# Patient Record
Sex: FEMALE | Race: WHITE | NOT HISPANIC OR LATINO | URBAN - METROPOLITAN AREA
[De-identification: names, ages, dates, MRNs, and addresses within clinical notes are randomized per-mention and may not be internally consistent; named-entity substitution may affect disease eponyms.]

---

## 2021-01-16 DIAGNOSIS — Z23 NEED FOR VACCINATION: ICD-10-CM

## 2023-09-20 ENCOUNTER — OFFICE (OUTPATIENT)
Dept: URBAN - METROPOLITAN AREA CLINIC 72 | Facility: CLINIC | Age: 78
End: 2023-09-20
Payer: COMMERCIAL

## 2023-09-20 VITALS
DIASTOLIC BLOOD PRESSURE: 80 MMHG | SYSTOLIC BLOOD PRESSURE: 118 MMHG | HEART RATE: 82 BPM | HEIGHT: 66 IN | WEIGHT: 219 LBS

## 2023-09-20 DIAGNOSIS — R19.5 OTHER FECAL ABNORMALITIES: ICD-10-CM

## 2023-09-20 DIAGNOSIS — D50.9 IRON DEFICIENCY ANEMIA, UNSPECIFIED: ICD-10-CM

## 2023-09-20 DIAGNOSIS — J44.9 CHRONIC OBSTRUCTIVE PULMONARY DISEASE, UNSPECIFIED: ICD-10-CM

## 2023-09-20 PROCEDURE — 99204 OFFICE O/P NEW MOD 45 MIN: CPT | Performed by: INTERNAL MEDICINE

## 2023-09-20 RX ORDER — POLYETHYLENE GLYOCOL 3350, SODIUM CHLORIDE, SODIUM BICARBONATE AND POTASSIUM CHLORIDE 420; 11.2; 5.72; 1.48 G/4L; G/4L; G/4L; G/4L
POWDER, FOR SOLUTION NASOGASTRIC; ORAL
Qty: 1 | Refills: 0 | Status: ACTIVE
Start: 2023-09-20

## 2023-09-20 NOTE — SERVICENOTES
Our goal is to partner with you to improve your health and well being. It is important for you to complete necessary testing and follow the instructions given to you at your clinic visit. Our office will call you within 2 weeks with results of any testing but you may also call sooner to obtain results - [phone].   If you have any questions or concerns please feel free to call us.  We take your care very seriously and we thank you for your trust!
- , - schedule EGD colonoscopy, if you have any issues with the prep (ie high cost, not covered) at the pharmacy please let us know
- stop iron 1 week before procedure (at the least 3 days, preferably longer)

## 2023-09-20 NOTE — SERVICEHPINOTES
Court Leroy   is seen for an initial visit today.  
br
br   She was referred by Dr. Cr for positive cologuard and anemia.  I reviewed labs and anemia is normocytic.
br
br She has never had a colonoscopy.  
br She had a cologuard that was positive , it was a year ago. 
br She was recently diagnosed with anemia but with iron it has corrected. 
br
br Iron causes some costipation and it is controlled with stool softener. 
br
br No blood in the stool, no black tarry stool (just changes due to iron)
br
br She has COPD, she is not on O2 and she is on once a day inhaler.  She does not have to use her rescue infaler. 
br She has joint pain and mobility issues as a results.  She uses a walker.  02 sat is currently 96% and daughter says it runs in the mid to high 90'sbr
br She is not on blood thinners.  Last year she had some chest pain and she was seen in the hospital.  She was in the hospital for 4 days.  she had cardiac cath and she was told that she had the heart of a 40 year old.  She was told that it was acid reflux and started famotidine and no problems since.  She saw cardiology a year later and told that she did not need to follow upbr
brRare stomach ache. 
br
br She has some chronic kidney disease.  Maria Luz nurse has reviewed and updated the medication list with the patient. I have reviewed the medication list along with the documented medical, social and family history. Pertinent details are also noted above in the HPI.

## 2023-10-13 ENCOUNTER — AMBULATORY SURGICAL CENTER (OUTPATIENT)
Dept: URBAN - METROPOLITAN AREA SURGERY 19 | Facility: SURGERY | Age: 78
End: 2023-10-13
Payer: COMMERCIAL

## 2023-10-13 ENCOUNTER — OFFICE (OUTPATIENT)
Dept: URBAN - METROPOLITAN AREA PATHOLOGY 10 | Facility: PATHOLOGY | Age: 78
End: 2023-10-13
Payer: MEDICARE

## 2023-10-13 DIAGNOSIS — K22.89 OTHER SPECIFIED DISEASE OF ESOPHAGUS: ICD-10-CM

## 2023-10-13 DIAGNOSIS — D50.9 IRON DEFICIENCY ANEMIA, UNSPECIFIED: ICD-10-CM

## 2023-10-13 DIAGNOSIS — D37.4 NEOPLASM OF UNCERTAIN BEHAVIOR OF COLON: ICD-10-CM

## 2023-10-13 LAB
COLONOSCOPY STUDY: (no result)
COLONOSCOPY STUDY: (no result)
RELEVANT H&P ENDOSCOPY: (no result)
RELEVANT H&P ENDOSCOPY: (no result)

## 2023-10-13 PROCEDURE — 45381 COLONOSCOPY SUBMUCOUS NJX: CPT | Mod: 59 | Performed by: INTERNAL MEDICINE

## 2023-10-13 PROCEDURE — 43239 EGD BIOPSY SINGLE/MULTIPLE: CPT | Mod: 51 | Performed by: INTERNAL MEDICINE

## 2023-10-13 PROCEDURE — 88342 IMHCHEM/IMCYTCHM 1ST ANTB: CPT | Mod: 59,TC | Performed by: STUDENT IN AN ORGANIZED HEALTH CARE EDUCATION/TRAINING PROGRAM

## 2023-10-13 PROCEDURE — 88305 TISSUE EXAM BY PATHOLOGIST: CPT | Mod: TC | Performed by: STUDENT IN AN ORGANIZED HEALTH CARE EDUCATION/TRAINING PROGRAM

## 2023-10-13 PROCEDURE — 88313 SPECIAL STAINS GROUP 2: CPT | Mod: TC | Performed by: STUDENT IN AN ORGANIZED HEALTH CARE EDUCATION/TRAINING PROGRAM

## 2023-10-13 PROCEDURE — 45380 COLONOSCOPY AND BIOPSY: CPT | Performed by: INTERNAL MEDICINE

## 2023-10-13 PROCEDURE — 88341 IMHCHEM/IMCYTCHM EA ADD ANTB: CPT | Mod: TC | Performed by: STUDENT IN AN ORGANIZED HEALTH CARE EDUCATION/TRAINING PROGRAM

## 2024-02-19 ENCOUNTER — OFFICE (OUTPATIENT)
Dept: URBAN - METROPOLITAN AREA CLINIC 72 | Facility: CLINIC | Age: 79
End: 2024-02-19
Payer: COMMERCIAL

## 2024-02-19 VITALS
SYSTOLIC BLOOD PRESSURE: 122 MMHG | HEIGHT: 66 IN | DIASTOLIC BLOOD PRESSURE: 78 MMHG | HEART RATE: 66 BPM | WEIGHT: 201 LBS | OXYGEN SATURATION: 96 %

## 2024-02-19 DIAGNOSIS — K52.9 NONINFECTIVE GASTROENTERITIS AND COLITIS, UNSPECIFIED: ICD-10-CM

## 2024-02-19 DIAGNOSIS — C18.9 MALIGNANT NEOPLASM OF COLON, UNSPECIFIED: ICD-10-CM

## 2024-02-19 DIAGNOSIS — R14.0 ABDOMINAL DISTENSION (GASEOUS): ICD-10-CM

## 2024-02-19 DIAGNOSIS — K52.1 TOXIC GASTROENTERITIS AND COLITIS: ICD-10-CM

## 2024-02-19 PROCEDURE — 99214 OFFICE O/P EST MOD 30 MIN: CPT | Performed by: INTERNAL MEDICINE

## 2024-02-19 NOTE — SERVICEHPINOTES
Court Leroy   is seen today for a follow-up visit.    
br
brstage 4B AC colon cancer diagnosed 10/23. Mets to liver, CT with possible mild SBO, nodes in RLQ. Undergoing chemo. 
br
br   she is getting bevacicumab (avastin)  and FOLFIRI, Leucovorin calcium (folinic acid)brFluorouracilbrIrinotecan hydrochloride
br
br She had chemo 3 weeks ago and had a 1/2 dose of irinotecan for the first time and soon after that SHe had diarrhea, and abdominal pain and gas/bloating.  The pain got progressively worse and she got more distended and went to ER.  CT showed extensive colitis and gas.  They started some antibiotics.  apparently the stool test showed a bacteria but not C diff.  
br
br 
She got home nad still having her gasbr
br teresa mooreThey think this is all due to the irinotecan. My nurse has reviewed and updated the medication list with the patient (medication reconciliation). I have also reviewed the medication list. New updates were made to the patient's medical, social and family history. Pertinent details are also noted above in the HPI.br visited="true"

## 2025-05-04 NOTE — SERVICENOTES
Our goal is to partner with you to improve your health and well being. It is important for you to complete necessary testing and follow the instructions given to you at your clinic visit. Our office will call you within 2 weeks with results of any testing but you may also call sooner to obtain results [phone].   If you have any questions or concerns please feel free to call.  We take your care very seriously and we thank you for your trust!
- I will get discharge summery from [facility]. 
- keep stool soft and easy to pass with miralax as needed. 
- you can take phazyme as needed
- lactose may be an issue. you can try lactaid.  Also can try beano with meals continaing fruits and veggies.  
- avoid heavy fibers, artificial sweeteners, prebiotics which all can cause increase gas. 
- , I recommend a trial off all no calorie and artificial sweeteners (aspartame, equal, stevia, Splenda, sugar alcohol, sweet and low etc) including all diet drinks (diet soda, crystal light, propel, G2 ect) and sugar free candies or other foods (yogurt, pudding ect).  Try this for 2 weeks and then you can add them back and see what symptoms come back or could be due to these sweeteners.  Stevia may cause less symptoms but can still be the culprit.
- follow up as needed
Private car